# Patient Record
Sex: MALE | NOT HISPANIC OR LATINO | URBAN - METROPOLITAN AREA
[De-identification: names, ages, dates, MRNs, and addresses within clinical notes are randomized per-mention and may not be internally consistent; named-entity substitution may affect disease eponyms.]

---

## 2020-05-05 ENCOUNTER — TRANSCRIBE ORDERS (OUTPATIENT)
Dept: ADMINISTRATIVE | Facility: HOSPITAL | Age: 45
End: 2020-05-05

## 2020-05-05 DIAGNOSIS — R31.9 HEMATURIA, UNSPECIFIED TYPE: Primary | ICD-10-CM

## 2020-05-12 ENCOUNTER — APPOINTMENT (OUTPATIENT)
Dept: CT IMAGING | Facility: HOSPITAL | Age: 45
End: 2020-05-12

## 2025-06-24 NOTE — PROGRESS NOTES
Chief Complaint  Snoring, Witnessed Apnea, Daytime Sleepiness, Fatigue, Frequent Awakenings, Non-restorative Sleep, Heartburn, Dry Mouth, and Unable To Fall Asleep    Subjective     History of Present Illness:  Indra Gutiérrez is a 49 y.o. male with hyperlipidemia, hypertriglyceridemia, and obesity.  Patient has a history of JESSIE.  Review of self-reported questionnaire notes symptoms including snoring, witnessed apnea, daytime sleepiness and fatigue, frequent awakening, nonrestorative sleep, heartburn/reflux, dry mouth, sore throat, leg cramps, frequent nighttime urination, difficulty staying asleep, and night sweats.  Patient reports symptoms have been ongoing for more than 5 years.  He last had a sleep study approximately 15 years ago at [facility].  Patient typically goes to bed at 9-11 p.m. waking at 6-7 a.m. on weekdays, and 9 PM-12 AM waking at 6-7 a.m. on weekends.  Patient estimates an average of 4-6 hours of sleep per night and it takes less than 5 minutes for him to get to sleep.  He takes naps for 20 to 50 minutes.  Patient denies use of tobacco, drinks alcohol monthly or less, and denies use of illicit or recreational drugs.  He drinks 2-3 diet andree daily.  Patient's spouse reports witnessed episodes of apnea which occur multiple times per night.  Additionally, the patient is observed with heavy snoring which occurs continuously, nightly, and in all sleeping positions.  Patient is also observed talking in his sleep. His device is about 20 years old though he continues to use it at times.     Further details are as follows:    Nahma Scale is (out of 24): Total score: 6     Estimated average amount of sleep per night: 4 to 6 hours  Number of times he wakes up at night: 2-3 times  Difficulty falling back asleep: no  It usually takes less than 5 minutes to go to sleep.  He feels sleepy upon waking up: yes  Rotating or night shift work: no    Drowsiness/Sleepiness:  He exhibits the  "following:  excessive daytime sleepiness  excessive daytime fatigue  falls asleep watching TV  Naps daily recently but this is not normal    Snoring/Breathing:  He exhibits the following:  loud snoring, quits breathing at night, awakens with dry mouth, and sore throat when waking up in the morning    Head Injury:  He exhibits the following:  No    Reflux:  He describes the following:  wakes up at night with a sour taste or burning sensation in chest  takes medication for reflux    Narcolepsy:  He exhibits the following:  none    RLS/PLMs:  He describes the following:  none    Insomnia:  He describes the following:  frequent awakenings    Parasomnia:  He exhibits the following:  sleep talks  excessive sweating while sleeping    Weight:       06/27/25  1435   Weight: 128 kg (281 lb 9.6 oz)      Weight change in the last year:  gain: 10-15 lbs    The patient's relevant past medical, surgical, family, and social history reviewed and updated in Epic as appropriate.    Review of Systems   Constitutional: Negative.    HENT:  Positive for hearing loss.    Eyes: Negative.    Respiratory: Negative.     Cardiovascular: Negative.    Gastrointestinal: Negative.    Endocrine: Negative.    Genitourinary: Negative.    Musculoskeletal:  Positive for neck pain and neck stiffness.   Skin: Negative.    Allergic/Immunologic: Negative.    Neurological: Negative.    Hematological: Negative.    Psychiatric/Behavioral: Negative.     All other systems reviewed and are negative.      PMH:    History reviewed. No pertinent past medical history.  History reviewed. No pertinent surgical history.    No Known Allergies    MEDS:  Prior to Admission medications    Not on File       FH:  History reviewed. No pertinent family history.    Objective   Vital Signs:  /70 (BP Location: Left arm, Patient Position: Sitting, Cuff Size: Adult)   Pulse 101   Temp 97.3 °F (36.3 °C) (Temporal)   Ht 179 cm (70.47\")   Wt 128 kg (281 lb 9.6 oz)   SpO2 96% "   BMI 39.87 kg/m²     Patient's (Body mass index is 39.87 kg/m².) indicates that they are obese (BMI >30) with health related conditions that include obstructive sleep apnea, hypertension, coronary heart disease, and diabetes mellitus . Weight is worsening. BMI is is above average; BMI management plan is completed. We discussed portion control and increasing exercise.         Physical Exam  Vitals reviewed.   Constitutional:       Appearance: Normal appearance.   HENT:      Head: Normocephalic and atraumatic.      Nose: Nose normal.      Mouth/Throat:      Mouth: Mucous membranes are moist.   Cardiovascular:      Rate and Rhythm: Normal rate and regular rhythm.      Heart sounds: No murmur heard.     No friction rub. No gallop.   Pulmonary:      Effort: Pulmonary effort is normal. No respiratory distress.      Breath sounds: Normal breath sounds. No wheezing or rhonchi.   Neurological:      Mental Status: He is alert and oriented to person, place, and time.   Psychiatric:         Behavior: Behavior normal.       Mallampati Score: IV (only hard palate visible)    Result Review :              Assessment and Plan  Indra Gutiérrez is a 49 y.o. male with hyperlipidemia, hypertriglyceridemia, and obesity who presents for further evaluation of excessive daytime sleepiness and fatigue, nonrestorative sleep, and concerns for sleep disordered breathing and obstructive sleep apnea.  Patient has a remote history of JESSIE approximately 15 to 20 years ago.  He used the PAP device but could never get comfortable with it.  The patient's symptoms, particularly snoring and apneic events, are concerning for ongoing and significant sleep disordered breathing and obstructive sleep apnea. We will obtain a home sleep test for further evaluation.  The patient will return for follow-up and recommendations after test.  I have discussed weight loss as it pertains to obstructive sleep apnea.    Diagnoses and all orders for this visit:    1.  Obstructive sleep apnea, adult (Primary)  -     Home Sleep Study; Future    2. Snoring  -     Home Sleep Study; Future    3. Excessive daytime sleepiness  -     Home Sleep Study; Future    4. Morbid (severe) obesity due to excess calories                 I discussed the consequences of uncontrolled sleep apnea including hypertension, heart disease, diabetes, stroke, and dementia. I further discussed sleep apnea therapeutic options including CPAP, Weight loss, Oral dental appliance, and surgery.         Follow Up  Return for Follow up after study.  Patient was given instructions and counseling regarding his condition or for health maintenance advice. Please see specific information pulled into the AVS if appropriate.     ELIA Gallardo, ACNP-BC  Pulmonology, Critical Care, and Sleep Medicine

## 2025-06-27 ENCOUNTER — OFFICE VISIT (OUTPATIENT)
Dept: SLEEP MEDICINE | Age: 50
End: 2025-06-27
Payer: COMMERCIAL

## 2025-06-27 VITALS
BODY MASS INDEX: 40.31 KG/M2 | TEMPERATURE: 97.3 F | SYSTOLIC BLOOD PRESSURE: 140 MMHG | DIASTOLIC BLOOD PRESSURE: 70 MMHG | HEART RATE: 101 BPM | WEIGHT: 281.6 LBS | HEIGHT: 70 IN | OXYGEN SATURATION: 96 %

## 2025-06-27 DIAGNOSIS — G47.33 OBSTRUCTIVE SLEEP APNEA, ADULT: Primary | ICD-10-CM

## 2025-06-27 DIAGNOSIS — E66.01 MORBID (SEVERE) OBESITY DUE TO EXCESS CALORIES: ICD-10-CM

## 2025-06-27 DIAGNOSIS — R06.83 SNORING: ICD-10-CM

## 2025-06-27 DIAGNOSIS — G47.19 EXCESSIVE DAYTIME SLEEPINESS: ICD-10-CM

## 2025-07-23 ENCOUNTER — HOSPITAL ENCOUNTER (OUTPATIENT)
Dept: SLEEP MEDICINE | Facility: HOSPITAL | Age: 50
Discharge: HOME | End: 2025-07-23
Admitting: NURSE PRACTITIONER
Payer: COMMERCIAL

## 2025-07-23 VITALS — HEIGHT: 70 IN | BODY MASS INDEX: 40.23 KG/M2 | WEIGHT: 281 LBS

## 2025-07-23 DIAGNOSIS — R06.83 SNORING: ICD-10-CM

## 2025-07-23 DIAGNOSIS — G47.19 EXCESSIVE DAYTIME SLEEPINESS: ICD-10-CM

## 2025-07-23 DIAGNOSIS — G47.33 OBSTRUCTIVE SLEEP APNEA, ADULT: ICD-10-CM

## 2025-07-23 PROCEDURE — 95800 SLP STDY UNATTENDED: CPT

## 2025-07-28 ENCOUNTER — RESULTS FOLLOW-UP (OUTPATIENT)
Dept: SLEEP MEDICINE | Age: 50
End: 2025-07-28
Payer: COMMERCIAL

## 2025-07-28 DIAGNOSIS — G47.33 OBSTRUCTIVE SLEEP APNEA, ADULT: Primary | ICD-10-CM

## 2025-07-28 NOTE — TELEPHONE ENCOUNTER
Patient is understanding of their sleep study results and is agreeable to PAP therapy. They would like to use [employer]. Orders have been faxed to DME. Patient's compliance appointment has also been made.